# Patient Record
Sex: MALE | Race: WHITE
[De-identification: names, ages, dates, MRNs, and addresses within clinical notes are randomized per-mention and may not be internally consistent; named-entity substitution may affect disease eponyms.]

---

## 2017-02-10 ENCOUNTER — HOSPITAL ENCOUNTER (OUTPATIENT)
Dept: HOSPITAL 45 - C.LABBC | Age: 62
Discharge: HOME | End: 2017-02-10
Attending: FAMILY MEDICINE
Payer: COMMERCIAL

## 2017-02-10 DIAGNOSIS — Z13.220: Primary | ICD-10-CM

## 2017-02-14 ENCOUNTER — HOSPITAL ENCOUNTER (OUTPATIENT)
Dept: HOSPITAL 45 - C.RADBC | Age: 62
Discharge: HOME | End: 2017-02-14
Attending: FAMILY MEDICINE
Payer: COMMERCIAL

## 2017-02-14 DIAGNOSIS — R05: Primary | ICD-10-CM

## 2017-02-14 NOTE — DIAGNOSTIC IMAGING REPORT
CHEST 2 VIEWS ROUTINE



CLINICAL HISTORY: COUGH dyspnea



COMPARISON STUDY:  1/2/2009



FINDINGS: Mild emphysematous change. Lungs are clear. Diaphragms smooth. 



IMPRESSION:  Mild emphysematous change. No acute process. 









Electronically signed by:  Sandro Curtis M.D.

2/14/2017 3:00 PM



Dictated Date/Time:  2/14/2017 2:59 PM

## 2017-02-14 NOTE — DIAGNOSTIC IMAGING REPORT
SINUSES MIN 3 VIEWS ROUTINE



CLINICAL HISTORY: COUGH dyspnea



COMPARISON STUDY:  None



FINDINGS: All major sinuses are clear.



IMPRESSION:  Normal exam 









Electronically signed by:  Sandro Curtis M.D.

2/14/2017 3:01 PM



Dictated Date/Time:  2/14/2017 3:00 PM

## 2017-02-20 ENCOUNTER — HOSPITAL ENCOUNTER (OUTPATIENT)
Dept: HOSPITAL 45 - C.LABSPEC | Age: 62
Discharge: HOME | End: 2017-02-20
Attending: ORTHOPAEDIC SURGERY
Payer: COMMERCIAL

## 2017-02-20 DIAGNOSIS — M25.469: Primary | ICD-10-CM

## 2017-02-20 LAB
APPEARANCE SNV: (no result)
COLOR SNV: YELLOW
SYNOVIAL FLUID MONONUC RELAT: 85.4 %
SYNOVIAL FLUID POLYNUC RELAT: 14.6 %

## 2017-02-24 ENCOUNTER — HOSPITAL ENCOUNTER (OUTPATIENT)
Dept: HOSPITAL 45 - C.LABBC | Age: 62
Discharge: HOME | End: 2017-02-24
Attending: FAMILY MEDICINE
Payer: COMMERCIAL

## 2017-02-24 DIAGNOSIS — M25.50: Primary | ICD-10-CM

## 2017-02-24 LAB
ALBUMIN/GLOB SERPL: 0.9 {RATIO} (ref 0.9–2)
ALP SERPL-CCNC: 92 U/L (ref 45–117)
ALT SERPL-CCNC: 34 U/L (ref 12–78)
ANION GAP SERPL CALC-SCNC: 11 MMOL/L (ref 3–11)
AST SERPL-CCNC: 20 U/L (ref 15–37)
BASOPHILS # BLD: 0.03 K/UL (ref 0–0.2)
BASOPHILS NFR BLD: 0.4 %
BUN SERPL-MCNC: 13 MG/DL (ref 7–18)
BUN/CREAT SERPL: 12 (ref 10–20)
CALCIUM SERPL-MCNC: 8.6 MG/DL (ref 8.5–10.1)
CHLORIDE SERPL-SCNC: 102 MMOL/L (ref 98–107)
CO2 SERPL-SCNC: 25 MMOL/L (ref 21–32)
COMPLETE: YES
CREAT SERPL-MCNC: 1.1 MG/DL (ref 0.6–1.4)
EOSINOPHIL NFR BLD AUTO: 312 K/UL (ref 130–400)
EST. AVERAGE GLUCOSE BLD GHB EST-MCNC: 120 MG/DL
GLOBULIN SER-MCNC: 3.7 GM/DL (ref 2.5–4)
GLUCOSE SERPL-MCNC: 70 MG/DL (ref 70–99)
GLUCOSE UR QL: 52 MG/DL
HCT VFR BLD CALC: 40.2 % (ref 42–52)
IG%: 0.2 %
IMM GRANULOCYTES NFR BLD AUTO: 22 %
LYMPHOCYTES # BLD: 1.81 K/UL (ref 1.2–3.4)
MCH RBC QN AUTO: 31.2 PG (ref 25–34)
MCHC RBC AUTO-ENTMCNC: 35.3 G/DL (ref 32–36)
MCV RBC AUTO: 88.4 FL (ref 80–100)
MONOCYTES NFR BLD: 10.8 %
NEUTROPHILS # BLD AUTO: 3.4 %
NEUTROPHILS NFR BLD AUTO: 63.2 %
PH UR: 134 MG/DL (ref 0–200)
PMV BLD AUTO: 9.8 FL (ref 7.4–10.4)
POTASSIUM SERPL-SCNC: 4.2 MMOL/L (ref 3.5–5.1)
RBC # BLD AUTO: 4.55 M/UL (ref 4.7–6.1)
RHEUMATOID FACT FLD-ACNC: 532 U/ML (ref 0–15)
SODIUM SERPL-SCNC: 138 MMOL/L (ref 136–145)
TSH SERPL-ACNC: 2.11 UIU/ML (ref 0.3–4.5)
URATE SERPL-MCNC: 5.4 MG/DL (ref 2.6–7.2)
WBC # BLD AUTO: 8.24 K/UL (ref 4.8–10.8)

## 2017-02-27 LAB
B BURGDOR IGM PATRN SER IB-IMP: NONREACTIVE
B BURGDOR18KD IGG SER QL IB: NONREACTIVE
B BURGDOR23KD IGG SER QL IB: NONREACTIVE
B BURGDOR28KD IGG SER QL IB: NONREACTIVE
B BURGDOR30KD IGG SER QL IB: NONREACTIVE
B BURGDOR39KD IGG SER QL IB: NONREACTIVE
B BURGDOR39KD IGG SER QL IB: NONREACTIVE
B BURGDOR41KD IGG SER QL IB: NONREACTIVE
B BURGDOR41KD IGG SNV QL IB: NONREACTIVE
B BURGDOR45KD IGG SER QL IB: NONREACTIVE
B BURGDOR58KD IGG SER QL IB: NONREACTIVE
B BURGDOR66KD IGG SER QL IB: REACTIVE
B BURGDOR93KD IGG SER QL IB: NONREACTIVE

## 2018-02-14 ENCOUNTER — HOSPITAL ENCOUNTER (OUTPATIENT)
Dept: HOSPITAL 45 - C.LABBC | Age: 63
Discharge: HOME | End: 2018-02-14
Attending: FAMILY MEDICINE
Payer: COMMERCIAL

## 2018-02-14 DIAGNOSIS — E55.9: ICD-10-CM

## 2018-02-14 DIAGNOSIS — D51.9: ICD-10-CM

## 2018-02-14 DIAGNOSIS — R73.09: Primary | ICD-10-CM

## 2018-02-14 LAB
ALBUMIN SERPL-MCNC: 3.7 GM/DL (ref 3.4–5)
ALP SERPL-CCNC: 95 U/L (ref 45–117)
ALT SERPL-CCNC: 36 U/L (ref 12–78)
AST SERPL-CCNC: 31 U/L (ref 15–37)
BASOPHILS # BLD: 0.03 K/UL (ref 0–0.2)
BASOPHILS NFR BLD: 0.8 %
BUN SERPL-MCNC: 12 MG/DL (ref 7–18)
CALCIUM SERPL-MCNC: 8.6 MG/DL (ref 8.5–10.1)
CO2 SERPL-SCNC: 28 MMOL/L (ref 21–32)
CREAT SERPL-MCNC: 1.02 MG/DL (ref 0.6–1.4)
EOS ABS #: 0.18 K/UL (ref 0–0.5)
EOSINOPHIL NFR BLD AUTO: 244 K/UL (ref 130–400)
GLUCOSE SERPL-MCNC: 94 MG/DL (ref 70–99)
HBA1C MFR BLD: 5.5 % (ref 4.5–5.6)
HCT VFR BLD CALC: 41.5 % (ref 42–52)
HGB BLD-MCNC: 14.7 G/DL (ref 14–18)
IG#: 0.01 K/UL (ref 0–0.02)
IMM GRANULOCYTES NFR BLD AUTO: 37.9 %
KETONES UR QL STRIP: 95 MG/DL
LYMPHOCYTES # BLD: 1.45 K/UL (ref 1.2–3.4)
MCH RBC QN AUTO: 31.7 PG (ref 25–34)
MCHC RBC AUTO-ENTMCNC: 35.4 G/DL (ref 32–36)
MCV RBC AUTO: 89.6 FL (ref 80–100)
MONO ABS #: 0.51 K/UL (ref 0.11–0.59)
MONOCYTES NFR BLD: 13.3 %
NEUT ABS #: 1.65 K/UL (ref 1.4–6.5)
NEUTROPHILS # BLD AUTO: 4.7 %
NEUTROPHILS NFR BLD AUTO: 43 %
PH UR: 160 MG/DL (ref 0–200)
PHOSPHATE SERPL-MCNC: 3.2 MG/DL (ref 2.5–4.9)
PMV BLD AUTO: 10.4 FL (ref 7.4–10.4)
POTASSIUM SERPL-SCNC: 4.4 MMOL/L (ref 3.5–5.1)
PROT SERPL-MCNC: 7.3 GM/DL (ref 6.4–8.2)
RED CELL DISTRIBUTION WIDTH CV: 13.5 % (ref 11.5–14.5)
RED CELL DISTRIBUTION WIDTH SD: 44.2 FL (ref 36.4–46.3)
SODIUM SERPL-SCNC: 137 MMOL/L (ref 136–145)
URATE SERPL-MCNC: 4.9 MG/DL (ref 2.6–7.2)
WBC # BLD AUTO: 3.83 K/UL (ref 4.8–10.8)

## 2018-04-05 NOTE — CODING QUERY MEDICAL NECESSITY
CQSUPPORTING DIAGNOSIS NEEDED





A supporting diagnosis is required for the test/procedure performed on this patient in 
order for us to be reimbursed by the patient's insurance. Please provide a supporting 
diagnosis for the following test/procedure listed below next to the test name along with 
your signature. 



*If there is no additional diagnosis for this patient that would support the following 
test/procedure please document that below next to the test/procedure.



Test(s)/Procedure(s) that require a supporting diagnosis:







DOS    02/14/18      PROSTTE SPECIFIC TEST







Provider Signature:  ______________________________  Date:  _______



Thank you  

Edwina London

Health Information Management

Phone:  [phone]

Fax:  [phone]



Once completed, please kindly fax back to [phone]



For questions please call [phone]